# Patient Record
Sex: MALE | URBAN - METROPOLITAN AREA
[De-identification: names, ages, dates, MRNs, and addresses within clinical notes are randomized per-mention and may not be internally consistent; named-entity substitution may affect disease eponyms.]

---

## 2020-08-24 ENCOUNTER — TELEPHONE (OUTPATIENT)
Dept: NEUROLOGY | Facility: CLINIC | Age: 45
End: 2020-08-24

## 2020-08-24 NOTE — TELEPHONE ENCOUNTER
W/C denying 9/4/20 OV. They are sending pt for KIRSTIN. We can see pt, but they will not cover. Will inform pt.

## 2020-08-24 NOTE — TELEPHONE ENCOUNTER
Pt gave Priya Felix with [employer] as the adjustor for his w/c claim # [id number]. Ph. [phone]; email: [email or web address]    KATHRIN to get approval for 9/4/20 OV.

## 2020-08-24 NOTE — TELEPHONE ENCOUNTER
Informed pt w/c denied 9/4/20 OV. They will contact him for an KIRSTIN. Suggested he could be seen but he would be self-pay or bill health ins. Recommended he cancel until after KIRSTIN because health ins may deny when they see it is w/c. Pt agreed, he will CB depending on KIRSTIN result.

## 2020-09-03 NOTE — TELEPHONE ENCOUNTER
W/C rep confirming pt's OV for tomorrow. Explained it was cancelled due to conversation with Idalmis Ennis. I was told they do not pre-approve OV. I informed her we do not accept w/c without pre-approval.  She will let me know.